# Patient Record
Sex: FEMALE | Race: WHITE | Employment: UNEMPLOYED | URBAN - METROPOLITAN AREA
[De-identification: names, ages, dates, MRNs, and addresses within clinical notes are randomized per-mention and may not be internally consistent; named-entity substitution may affect disease eponyms.]

---
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
## 2024-11-29 ENCOUNTER — HOSPITAL ENCOUNTER (EMERGENCY)
Facility: CLINIC | Age: 19
Discharge: HOME | End: 2024-11-29
Attending: EMERGENCY MEDICINE | Admitting: EMERGENCY MEDICINE
Payer: COMMERCIAL

## 2024-11-29 VITALS
BODY MASS INDEX: 26.92 KG/M2 | DIASTOLIC BLOOD PRESSURE: 108 MMHG | RESPIRATION RATE: 18 BRPM | TEMPERATURE: 97.7 F | HEART RATE: 78 BPM | OXYGEN SATURATION: 100 % | WEIGHT: 171.52 LBS | SYSTOLIC BLOOD PRESSURE: 164 MMHG | HEIGHT: 67 IN

## 2024-11-29 DIAGNOSIS — R03.0 ELEVATED BLOOD PRESSURE READING WITHOUT DIAGNOSIS OF HYPERTENSION: ICD-10-CM

## 2024-11-29 DIAGNOSIS — K08.89 PAIN, DENTAL: ICD-10-CM

## 2024-11-29 PROCEDURE — 250N000013 HC RX MED GY IP 250 OP 250 PS 637: Performed by: EMERGENCY MEDICINE

## 2024-11-29 PROCEDURE — 99283 EMERGENCY DEPT VISIT LOW MDM: CPT

## 2024-11-29 RX ORDER — PENICILLIN V POTASSIUM 500 MG/1
500 TABLET, FILM COATED ORAL 4 TIMES DAILY
Qty: 28 TABLET | Refills: 0 | Status: SHIPPED | OUTPATIENT
Start: 2024-11-29 | End: 2024-12-06

## 2024-11-29 RX ORDER — OXYCODONE HYDROCHLORIDE 5 MG/1
5 TABLET ORAL ONCE
Status: COMPLETED | OUTPATIENT
Start: 2024-11-29 | End: 2024-11-29

## 2024-11-29 RX ADMIN — OXYCODONE HYDROCHLORIDE 5 MG: 5 TABLET ORAL at 07:35

## 2024-11-29 ASSESSMENT — ACTIVITIES OF DAILY LIVING (ADL): ADLS_ACUITY_SCORE: 41

## 2024-11-29 NOTE — ED PROVIDER NOTES
"  Emergency Department Note      History of Present Illness     Chief Complaint   Dental Pain      PATRICA Maxwell is a 19 year old female with a history of anxiety who presents to the Emergency Department for dental pain. The patient reports pain in her upper left gums for the past 3 days that feels like shooting nerve pain. She is aware that she has cavities, but has been unable to get into the dentist. She says the pain was originally managed by Tylenol and numbing gel, but now only swishing cold water helps dull the pain. She still has her wisdom teeth and notes her gums look red and puffy.    Independent Historian   None    Review of External Notes   None    Past Medical History     Medical History and Problem List   Anxiety   Depression   Panic disorder     Medications   hydrOXYzine   Levonorgestrel     Surgical History   Tonsillectomy   Plate and screws to clavicle  D&C  IUD insertion    Physical Exam     Patient Vitals for the past 24 hrs:   BP Temp Pulse Resp SpO2 Height Weight   11/29/24 0658 (!) 164/108 97.7  F (36.5  C) 78 18 100 % 1.702 m (5' 7\") 77.8 kg (171 lb 8.3 oz)     Physical Exam  General:              Well-nourished              Speaking in full sentences  Eyes:              Conjunctiva without injection or scleral icterus  ENT:              Tenderness to percussion about tooth #16              Fluctuance about buccal / lingual gingiva absent              No discharge              No tongue swelling              No tongue elevation              No oral lesions              No trismus              Tolerating secretions without difficulty              Posterior oropharynx is symmetric without asymmetry or uvular deviation              No facial swelling, asymmetry or erythema  Neck:              No lymphadenopathy  Resp:              Even, non-labored respirations  CV:                    Regular rate and rhythm  Skin:              Warm, dry, well perfused              No rashes or open " wounds on exposed skin  MSK:              Moves all extremities              No focal deformities or swelling  Neuro:              Alert              Answers questions appropriately              Moves all extremities equally              Gait stable  Psych:              Normal affect, normal mood    Diagnostics     Lab Results   Labs Ordered and Resulted from Time of ED Arrival to Time of ED Departure - No data to display    Imaging   No orders to display     EKG   None    Independent Interpretation   None    ED Course      Medications Administered   Medications   oxyCODONE (ROXICODONE) tablet 5 mg (5 mg Oral $Given 11/29/24 0735)       Procedures   None    Discussion of Management   None    ED Course   ED Course as of 11/29/24 0746 Fri Nov 29, 2024   0719 I evaluated the patient, obtained history, and performed a physical exam as detailed above. The patient is safe for discharge.        Additional Documentation  None    Medical Decision Making / Diagnosis     CMS Diagnoses: None    MIPS       None    MDM   Leena Maxwell is a 19-year-old female presenting to the ER for evaluation of dental pain.  VS on presentation reveal elevated BP, likely driven by pain, though otherwise are unremarkable.  Examination consistent with carious dentition and likely apical root infection given focal pain over this area.  There is no fluctuance amenable to percutaneous drainage at this time that would raise concern for dental abscess.  She has no signs of facial cellulitis or deeper space neck infection.  She was provided 1 dose of oral oxycodone for analgesia.  I did offer her a dental block, though she is declining at this time.  She is understanding that dentistry will need to be involved for definitive management of the affected tooth.  Dental resources were provided to the patient and she was encouraged to continue with arranging outpatient follow-up.  She will be started on penicillin to be taken 4 times daily for 7 days.   She is to return to the ER with worsening pain, swelling, fevers, chills, or any other concerns.  Questions answered prior to discharge.     Disposition   The patient was discharged.     Diagnosis     ICD-10-CM    1. Pain, dental  K08.89       2. Elevated blood pressure reading without diagnosis of hypertension  R03.0            Discharge Medications   New Prescriptions    PENICILLIN V (VEETID) 500 MG TABLET    Take 1 tablet (500 mg) by mouth 4 times daily for 7 days.     Scribe Disclosure:  I, Smita Grijalva, am serving as a scribe at 7:36 AM on 11/29/2024 to document services personally performed by Art Sun MD based on my observations and the provider's statements to me.        Art Sun MD  11/29/24 1784

## 2024-11-29 NOTE — DISCHARGE INSTRUCTIONS
Please follow-up with dentistry for definitive management of your dental pain.  Begin the antibiotic as discussed.    Return to the ER if you develop increasing pain, swelling, fevers, chills or any other concerns.    Discharge Instructions  Dental Pain    You have been seen today for a toothache. Your pain may be caused by an exposed nerve, an infection (pulpitis), a root abscess (pocket of pus), or other problems. You will need to see a dentist for a solution to your tooth problem. Emergency Department care is only to help control your problem until you can see a dentist; we cannot provide complete dental care.  Today, we did not find any sign that your toothache was caused by any dangerous or life-threatening condition, but sometimes symptoms develop over time and cannot be found during an emergency visit, so it is very important that you follow up with your dentist.      Generally, every Emergency Department visit should have a follow-up clinic visit with either a primary or a specialty clinic/provider. Please follow-up as instructed by your emergency provider today.    Return to the Emergency Department if:  You develop a new fever over 100.4 F.  You cannot open your mouth normally, cannot move your tongue well, or cannot swallow.  You have new or increased swelling of your face or neck.  You develop drainage of pus or foul smelling material from around your tooth.  What can I do to help myself?  Take any antibiotic the provider may have prescribed for you today.  Avoid very hot or very cold foods as both can cause pain.  Make an appointment to see a dentist as soon as possible. Dentists are generally not  on-staff  at hospitals so we cannot  refer  to you to dentist but we may be able to provide a list of dental clinics to help you.  If you were given a prescription for medicine here today, be sure to read all of the information (including the package insert) that comes with your prescription.  This will  include important information about the medicine, its side effects, and any warnings that you need to know about.  The pharmacist who fills the prescription can provide more information and answer questions you may have about the medicine.  If you have questions or concerns that the pharmacist cannot address, please call or return to the Emergency Department.   Remember that you can always come back to the Emergency Department if you are not able to see your regular provider in the amount of time listed above, if you get any new symptoms, or if there is anything that worries you.

## 2024-11-29 NOTE — ED TRIAGE NOTES
Pt presents to triage with c/o dental pain. Top left side of mouth hurts. Thinks maybe wisdom teeth are coming in. Endorses swelling. Numbing gel, tylenol 1 hour pta not effective. Not able to get into any dentists.

## 2024-11-29 NOTE — LETTER
November 29, 2024      To Whom It May Concern:      Alliladerick Maxwell was seen in our Emergency Department today, 11/29/24.  I expect her condition to improve over the next 1-2 days.  She may return to work when improved.    Sincerely,        DOTTIE Leach

## (undated) DEVICE — SOL NACL 0.9% IRRIG 1000ML BOTTLE 2F7124

## (undated) DEVICE — LINEN GOWN X4 5410

## (undated) DEVICE — SPECIMEN TRAP VACUUM SUCTION SAFETOUCH 003853-902

## (undated) DEVICE — SUCTION VACUUM CANISTER STANDARD W/LID&CAPS 003987-901

## (undated) DEVICE — PAD CHUX UNDERPAD 30X36" P3036C

## (undated) DEVICE — GLOVE PROTEXIS W/NEU-THERA 7.0  2D73TE70

## (undated) DEVICE — SUCTION CANNULA UTERINE 08MM CVD  20317

## (undated) DEVICE — LINEN TOWEL PACK X5 5464

## (undated) DEVICE — TUBING SUCTION VACUUM COLLECTION 6FT 610

## (undated) DEVICE — GLOVE PROTEXIS BLUE W/NEU-THERA 7.5  2D73EB75

## (undated) DEVICE — SOL WATER IRRIG 1000ML BOTTLE 2F7114

## (undated) DEVICE — Device

## (undated) DEVICE — NDL SPINAL 20GA 3.5" 405182

## (undated) RX ORDER — PROPOFOL 10 MG/ML
INJECTION, EMULSION INTRAVENOUS
Status: DISPENSED
Start: 2020-11-12

## (undated) RX ORDER — FENTANYL CITRATE 50 UG/ML
INJECTION, SOLUTION INTRAMUSCULAR; INTRAVENOUS
Status: DISPENSED
Start: 2020-11-12

## (undated) RX ORDER — KETOROLAC TROMETHAMINE 30 MG/ML
INJECTION, SOLUTION INTRAMUSCULAR; INTRAVENOUS
Status: DISPENSED
Start: 2020-11-12

## (undated) RX ORDER — ACETAMINOPHEN 325 MG/1
TABLET ORAL
Status: DISPENSED
Start: 2020-11-12

## (undated) RX ORDER — ONDANSETRON 2 MG/ML
INJECTION INTRAMUSCULAR; INTRAVENOUS
Status: DISPENSED
Start: 2020-11-12